# Patient Record
Sex: MALE | Race: BLACK OR AFRICAN AMERICAN | URBAN - METROPOLITAN AREA
[De-identification: names, ages, dates, MRNs, and addresses within clinical notes are randomized per-mention and may not be internally consistent; named-entity substitution may affect disease eponyms.]

---

## 2018-11-16 ENCOUNTER — HISTORICAL (OUTPATIENT)
Dept: WOUND CARE | Facility: HOSPITAL | Age: 53
End: 2018-11-16

## 2018-11-23 ENCOUNTER — HISTORICAL (OUTPATIENT)
Dept: WOUND CARE | Facility: HOSPITAL | Age: 53
End: 2018-11-23

## 2018-11-30 ENCOUNTER — HISTORICAL (OUTPATIENT)
Dept: WOUND CARE | Facility: HOSPITAL | Age: 53
End: 2018-11-30

## 2018-12-07 ENCOUNTER — HISTORICAL (OUTPATIENT)
Dept: WOUND CARE | Facility: HOSPITAL | Age: 53
End: 2018-12-07

## 2018-12-10 LAB — FINAL CULTURE: NORMAL

## 2018-12-14 ENCOUNTER — HISTORICAL (OUTPATIENT)
Dept: WOUND CARE | Facility: HOSPITAL | Age: 53
End: 2018-12-14

## 2018-12-21 ENCOUNTER — HISTORICAL (OUTPATIENT)
Dept: WOUND CARE | Facility: HOSPITAL | Age: 53
End: 2018-12-21

## 2018-12-28 ENCOUNTER — HISTORICAL (OUTPATIENT)
Dept: WOUND CARE | Facility: HOSPITAL | Age: 53
End: 2018-12-28

## 2019-01-04 ENCOUNTER — HISTORICAL (OUTPATIENT)
Dept: WOUND CARE | Facility: HOSPITAL | Age: 54
End: 2019-01-04

## 2019-01-11 ENCOUNTER — HISTORICAL (OUTPATIENT)
Dept: WOUND CARE | Facility: HOSPITAL | Age: 54
End: 2019-01-11

## 2019-01-25 ENCOUNTER — HISTORICAL (OUTPATIENT)
Dept: WOUND CARE | Facility: HOSPITAL | Age: 54
End: 2019-01-25

## 2019-11-22 ENCOUNTER — HISTORICAL (OUTPATIENT)
Dept: WOUND CARE | Facility: HOSPITAL | Age: 54
End: 2019-11-22

## 2019-12-04 ENCOUNTER — HISTORICAL (OUTPATIENT)
Dept: ADMINISTRATIVE | Facility: HOSPITAL | Age: 54
End: 2019-12-04

## 2019-12-04 LAB
ABS NEUT (OLG): 3.9 X10(3)/MCL (ref 1.5–6.9)
ALBUMIN SERPL-MCNC: 2.7 GM/DL (ref 3.4–5)
ALBUMIN/GLOB SERPL: 0.6 RATIO
ALP SERPL-CCNC: 52 UNIT/L (ref 30–113)
ALT SERPL-CCNC: 51 UNIT/L (ref 10–45)
AST SERPL-CCNC: 26 UNIT/L (ref 15–37)
BASOPHILS # BLD AUTO: 0 X10(3)/MCL (ref 0–0.1)
BASOPHILS NFR BLD AUTO: 1 % (ref 0–1)
BILIRUB SERPL-MCNC: 0.4 MG/DL (ref 0.1–0.9)
BILIRUBIN DIRECT+TOT PNL SERPL-MCNC: 0.1 MG/DL (ref 0–0.3)
BILIRUBIN DIRECT+TOT PNL SERPL-MCNC: 0.3 MG/DL
BUN SERPL-MCNC: 10 MG/DL (ref 10–20)
CALCIUM SERPL-MCNC: 8.9 MG/DL (ref 8–10.5)
CHLORIDE SERPL-SCNC: 106 MMOL/L (ref 100–108)
CO2 SERPL-SCNC: 30 MMOL/L (ref 21–35)
CREAT SERPL-MCNC: 0.83 MG/DL (ref 0.7–1.3)
EOSINOPHIL # BLD AUTO: 0.2 X10(3)/MCL (ref 0–0.6)
EOSINOPHIL NFR BLD AUTO: 2 % (ref 0–5)
ERYTHROCYTE [DISTWIDTH] IN BLOOD BY AUTOMATED COUNT: 12.3 % (ref 11.5–17)
GLOBULIN SER-MCNC: 4.5 GM/DL
GLUCOSE SERPL-MCNC: 137 MG/DL (ref 75–116)
HCT VFR BLD AUTO: 33.7 % (ref 42–52)
HGB BLD-MCNC: 11.1 GM/DL (ref 14–18)
IMM GRANULOCYTES # BLD AUTO: 0.02 10*3/UL (ref 0–0.02)
IMM GRANULOCYTES NFR BLD AUTO: 0.3 % (ref 0–0.43)
LYMPHOCYTES # BLD AUTO: 1.9 X10(3)/MCL (ref 0.5–4.1)
LYMPHOCYTES NFR BLD AUTO: 28 % (ref 15–40)
MAGNESIUM SERPL-MCNC: 1.9 MG/DL (ref 1.8–2.4)
MCH RBC QN AUTO: 29 PG (ref 27–34)
MCHC RBC AUTO-ENTMCNC: 33 GM/DL (ref 31–36)
MCV RBC AUTO: 89 FL (ref 80–99)
MONOCYTES # BLD AUTO: 0.7 X10(3)/MCL (ref 0–1.1)
MONOCYTES NFR BLD AUTO: 10 % (ref 4–12)
NEUTROPHILS # BLD AUTO: 3.9 X10(3)/MCL (ref 1.5–6.9)
NEUTROPHILS NFR BLD AUTO: 58 % (ref 43–75)
PHOSPHATE SERPL-MCNC: 3.8 MG/DL (ref 2.6–4.7)
PLATELET # BLD AUTO: 203 X10(3)/MCL (ref 140–400)
PMV BLD AUTO: 11.5 FL (ref 6.8–10)
POTASSIUM SERPL-SCNC: 3.7 MMOL/L (ref 3.6–5.2)
PROT SERPL-MCNC: 7.2 GM/DL (ref 6.4–8.2)
RBC # BLD AUTO: 3.8 X10(6)/MCL (ref 4.7–6.1)
SODIUM SERPL-SCNC: 142 MMOL/L (ref 135–145)
VANCOMYCIN TROUGH SERPL-MCNC: 10.2 MCG/ML (ref 10–20)
VANCOMYCIN TROUGH SERPL-MCNC: 11.2 MCG/ML (ref 10–20)
WBC # SPEC AUTO: 6.7 X10(3)/MCL (ref 4.5–11.5)

## 2019-12-05 LAB
ABS NEUT (OLG): 4 X10(3)/MCL (ref 1.5–6.9)
ALBUMIN SERPL-MCNC: 2.9 GM/DL (ref 3.4–5)
ALBUMIN/GLOB SERPL: 0.6 RATIO
ALP SERPL-CCNC: 58 UNIT/L (ref 30–113)
ALT SERPL-CCNC: 55 UNIT/L (ref 10–45)
AST SERPL-CCNC: 27 UNIT/L (ref 15–37)
BASOPHILS # BLD AUTO: 0 X10(3)/MCL (ref 0–0.1)
BASOPHILS NFR BLD AUTO: 1 % (ref 0–1)
BILIRUB SERPL-MCNC: 0.4 MG/DL (ref 0.1–0.9)
BILIRUBIN DIRECT+TOT PNL SERPL-MCNC: 0.1 MG/DL (ref 0–0.3)
BILIRUBIN DIRECT+TOT PNL SERPL-MCNC: 0.3 MG/DL
BUN SERPL-MCNC: 10 MG/DL (ref 10–20)
CALCIUM SERPL-MCNC: 9.1 MG/DL (ref 8–10.5)
CHLORIDE SERPL-SCNC: 106 MMOL/L (ref 100–108)
CO2 SERPL-SCNC: 31 MMOL/L (ref 21–35)
CREAT SERPL-MCNC: 0.71 MG/DL (ref 0.7–1.3)
EOSINOPHIL # BLD AUTO: 0.2 X10(3)/MCL (ref 0–0.6)
EOSINOPHIL NFR BLD AUTO: 3 % (ref 0–5)
ERYTHROCYTE [DISTWIDTH] IN BLOOD BY AUTOMATED COUNT: 12.5 % (ref 11.5–17)
GLOBULIN SER-MCNC: 4.7 GM/DL
GLUCOSE SERPL-MCNC: 128 MG/DL (ref 75–116)
HCT VFR BLD AUTO: 36.1 % (ref 42–52)
HGB BLD-MCNC: 11.8 GM/DL (ref 14–18)
IMM GRANULOCYTES # BLD AUTO: 0.03 10*3/UL (ref 0–0.02)
IMM GRANULOCYTES NFR BLD AUTO: 0.4 % (ref 0–0.43)
LYMPHOCYTES # BLD AUTO: 2.1 X10(3)/MCL (ref 0.5–4.1)
LYMPHOCYTES NFR BLD AUTO: 30 % (ref 15–40)
MAGNESIUM SERPL-MCNC: 1.9 MG/DL (ref 1.8–2.4)
MCH RBC QN AUTO: 29 PG (ref 27–34)
MCHC RBC AUTO-ENTMCNC: 33 GM/DL (ref 31–36)
MCV RBC AUTO: 89 FL (ref 80–99)
MONOCYTES # BLD AUTO: 0.6 X10(3)/MCL (ref 0–1.1)
MONOCYTES NFR BLD AUTO: 8 % (ref 4–12)
NEUTROPHILS # BLD AUTO: 4 X10(3)/MCL (ref 1.5–6.9)
NEUTROPHILS NFR BLD AUTO: 58 % (ref 43–75)
PHOSPHATE SERPL-MCNC: 4.1 MG/DL (ref 2.6–4.7)
PLATELET # BLD AUTO: 224 X10(3)/MCL (ref 140–400)
PMV BLD AUTO: 11.5 FL (ref 6.8–10)
POTASSIUM SERPL-SCNC: 3.8 MMOL/L (ref 3.6–5.2)
PROT SERPL-MCNC: 7.6 GM/DL (ref 6.4–8.2)
RBC # BLD AUTO: 4.04 X10(6)/MCL (ref 4.7–6.1)
SODIUM SERPL-SCNC: 141 MMOL/L (ref 135–145)
WBC # SPEC AUTO: 7 X10(3)/MCL (ref 4.5–11.5)

## 2019-12-06 LAB — VANCOMYCIN TROUGH SERPL-MCNC: 13.7 MCG/ML (ref 10–20)

## 2019-12-08 LAB — VANCOMYCIN TROUGH SERPL-MCNC: 13.7 MCG/ML (ref 10–20)

## 2019-12-10 LAB — VANCOMYCIN TROUGH SERPL-MCNC: 15.3 MCG/ML (ref 10–20)

## 2019-12-11 LAB
ABS NEUT (OLG): 3.7 X10(3)/MCL (ref 1.5–6.9)
ALBUMIN SERPL-MCNC: 3.1 GM/DL (ref 3.4–5)
ALBUMIN/GLOB SERPL: 0.7 RATIO
ALP SERPL-CCNC: 52 UNIT/L (ref 30–113)
ALT SERPL-CCNC: 72 UNIT/L (ref 10–45)
AST SERPL-CCNC: 22 UNIT/L (ref 15–37)
BASOPHILS # BLD AUTO: 0 X10(3)/MCL (ref 0–0.1)
BASOPHILS NFR BLD AUTO: 0 % (ref 0–1)
BILIRUB SERPL-MCNC: 0.3 MG/DL (ref 0.1–0.9)
BILIRUBIN DIRECT+TOT PNL SERPL-MCNC: 0.1 MG/DL (ref 0–0.3)
BILIRUBIN DIRECT+TOT PNL SERPL-MCNC: 0.2 MG/DL
BUN SERPL-MCNC: 12 MG/DL (ref 10–20)
CALCIUM SERPL-MCNC: 9.5 MG/DL (ref 8–10.5)
CHLORIDE SERPL-SCNC: 104 MMOL/L (ref 100–108)
CO2 SERPL-SCNC: 33 MMOL/L (ref 21–35)
CREAT SERPL-MCNC: 0.75 MG/DL (ref 0.7–1.3)
EOSINOPHIL # BLD AUTO: 0.2 X10(3)/MCL (ref 0–0.6)
EOSINOPHIL NFR BLD AUTO: 3 % (ref 0–5)
ERYTHROCYTE [DISTWIDTH] IN BLOOD BY AUTOMATED COUNT: 12.6 % (ref 11.5–17)
GLOBULIN SER-MCNC: 4.4 GM/DL
GLUCOSE SERPL-MCNC: 139 MG/DL (ref 75–116)
HCT VFR BLD AUTO: 36.8 % (ref 42–52)
HGB BLD-MCNC: 11.7 GM/DL (ref 14–18)
IMM GRANULOCYTES # BLD AUTO: 0.03 10*3/UL (ref 0–0.02)
IMM GRANULOCYTES NFR BLD AUTO: 0.5 % (ref 0–0.43)
LYMPHOCYTES # BLD AUTO: 2 X10(3)/MCL (ref 0.5–4.1)
LYMPHOCYTES NFR BLD AUTO: 30 % (ref 15–40)
MCH RBC QN AUTO: 29 PG (ref 27–34)
MCHC RBC AUTO-ENTMCNC: 32 GM/DL (ref 31–36)
MCV RBC AUTO: 90 FL (ref 80–99)
MONOCYTES # BLD AUTO: 0.5 X10(3)/MCL (ref 0–1.1)
MONOCYTES NFR BLD AUTO: 8 % (ref 4–12)
NEUTROPHILS # BLD AUTO: 3.7 X10(3)/MCL (ref 1.5–6.9)
NEUTROPHILS NFR BLD AUTO: 58 % (ref 43–75)
PLATELET # BLD AUTO: 202 X10(3)/MCL (ref 140–400)
PMV BLD AUTO: 11.2 FL (ref 6.8–10)
POTASSIUM SERPL-SCNC: 4 MMOL/L (ref 3.6–5.2)
PROT SERPL-MCNC: 7.5 GM/DL (ref 6.4–8.2)
RBC # BLD AUTO: 4.07 X10(6)/MCL (ref 4.7–6.1)
SODIUM SERPL-SCNC: 144 MMOL/L (ref 135–145)
WBC # SPEC AUTO: 6.5 X10(3)/MCL (ref 4.5–11.5)

## 2019-12-12 LAB — VANCOMYCIN TROUGH SERPL-MCNC: 13 MCG/ML (ref 10–20)

## 2019-12-13 LAB — VANCOMYCIN TROUGH SERPL-MCNC: 14 MCG/ML (ref 10–20)

## 2019-12-14 LAB — VANCOMYCIN TROUGH SERPL-MCNC: 21.5 MCG/ML (ref 10–20)

## 2019-12-15 LAB — VANCOMYCIN SERPL-MCNC: 5.7 MCG/ML

## 2019-12-17 LAB
BUN SERPL-MCNC: 13 MG/DL (ref 10–20)
CALCIUM SERPL-MCNC: 9.2 MG/DL (ref 8–10.5)
CHLORIDE SERPL-SCNC: 106 MMOL/L (ref 100–108)
CO2 SERPL-SCNC: 32 MMOL/L (ref 21–35)
CREAT SERPL-MCNC: 0.87 MG/DL (ref 0.7–1.3)
CREAT/UREA NIT SERPL: 15
GLUCOSE SERPL-MCNC: 122 MG/DL (ref 75–116)
POTASSIUM SERPL-SCNC: 3.9 MMOL/L (ref 3.6–5.2)
SODIUM SERPL-SCNC: 145 MMOL/L (ref 135–145)
VANCOMYCIN TROUGH SERPL-MCNC: 13 MCG/ML (ref 10–20)

## 2019-12-18 LAB — VANCOMYCIN TROUGH SERPL-MCNC: 12.4 MCG/ML (ref 10–20)

## 2019-12-20 LAB — VANCOMYCIN TROUGH SERPL-MCNC: 19 MCG/ML (ref 10–20)

## 2019-12-22 LAB
VANCOMYCIN TROUGH SERPL-MCNC: 18.8 MCG/ML (ref 10–20)
VANCOMYCIN TROUGH SERPL-MCNC: 19 MCG/ML (ref 10–20)

## 2019-12-23 LAB — VANCOMYCIN TROUGH SERPL-MCNC: 16.9 MCG/ML (ref 10–20)

## 2020-02-23 LAB — GRAM STN SPEC: NORMAL

## 2020-02-25 LAB — FINAL CULTURE: NORMAL

## 2021-12-10 LAB — VANCOMYCIN SERPL-MCNC: 9 UG/ML (ref 15–20)

## 2021-12-11 ENCOUNTER — HISTORICAL (OUTPATIENT)
Dept: ADMINISTRATIVE | Facility: HOSPITAL | Age: 56
End: 2021-12-11

## 2021-12-11 LAB
ABS NEUT (OLG): 5.1 X10(3)/MCL (ref 1.5–6.9)
ALBUMIN SERPL-MCNC: 2.6 GM/DL (ref 3.5–5)
ALBUMIN/GLOB SERPL: 0.7 RATIO (ref 1.1–2)
ALP SERPL-CCNC: 54 UNIT/L (ref 40–150)
ALT SERPL-CCNC: 26 UNIT/L (ref 0–55)
AST SERPL-CCNC: 18 UNIT/L (ref 5–34)
BASOPHILS # BLD AUTO: 0 X10(3)/MCL (ref 0–0.1)
BASOPHILS NFR BLD AUTO: 0 % (ref 0–1)
BILIRUB SERPL-MCNC: 0.4 MG/DL
BILIRUBIN DIRECT+TOT PNL SERPL-MCNC: 0.2 MG/DL (ref 0–0.5)
BILIRUBIN DIRECT+TOT PNL SERPL-MCNC: 0.2 MG/DL (ref 0–0.8)
BUN SERPL-MCNC: 9 MG/DL (ref 8.4–25.7)
CALCIUM SERPL-MCNC: 9.1 MG/DL (ref 8.7–10.5)
CHLORIDE SERPL-SCNC: 105 MMOL/L (ref 98–107)
CO2 SERPL-SCNC: 27 MMOL/L (ref 22–29)
CREAT SERPL-MCNC: 0.78 MG/DL (ref 0.73–1.18)
EOSINOPHIL # BLD AUTO: 0.3 X10(3)/MCL (ref 0–0.6)
EOSINOPHIL NFR BLD AUTO: 4 % (ref 0–5)
ERYTHROCYTE [DISTWIDTH] IN BLOOD BY AUTOMATED COUNT: 11.8 % (ref 11.5–17)
EST. AVERAGE GLUCOSE BLD GHB EST-MCNC: 208.7 MG/DL
GLOBULIN SER-MCNC: 3.9 GM/DL (ref 2.4–3.5)
GLUCOSE SERPL-MCNC: 114 MG/DL (ref 74–100)
HBA1C MFR BLD: 8.9 %
HCT VFR BLD AUTO: 37 % (ref 42–52)
HGB BLD-MCNC: 12.4 GM/DL (ref 14–18)
IMM GRANULOCYTES # BLD AUTO: 0.03 10*3/UL (ref 0–0.02)
IMM GRANULOCYTES NFR BLD AUTO: 0.4 % (ref 0–0.43)
LYMPHOCYTES # BLD AUTO: 1.5 X10(3)/MCL (ref 0.5–4.1)
LYMPHOCYTES NFR BLD AUTO: 19 % (ref 15–40)
MAGNESIUM SERPL-MCNC: 1.9 MG/DL (ref 1.6–2.6)
MCH RBC QN AUTO: 29 PG (ref 27–34)
MCHC RBC AUTO-ENTMCNC: 34 GM/DL (ref 31–36)
MCV RBC AUTO: 86 FL (ref 80–99)
MONOCYTES # BLD AUTO: 0.9 X10(3)/MCL (ref 0–1.1)
MONOCYTES NFR BLD AUTO: 11 % (ref 4–12)
NEUTROPHILS # BLD AUTO: 5.1 X10(3)/MCL (ref 1.5–6.9)
NEUTROPHILS NFR BLD AUTO: 66 % (ref 43–75)
PHOSPHATE SERPL-MCNC: 3.3 MG/DL (ref 2.3–4.7)
PLATELET # BLD AUTO: 225 X10(3)/MCL (ref 140–400)
PMV BLD AUTO: 11.9 FL (ref 6.8–10)
POTASSIUM SERPL-SCNC: 4.1 MMOL/L (ref 3.5–5.1)
PREALB SERPL-MCNC: 8.6 MG/DL (ref 18–45)
PROT SERPL-MCNC: 6.5 GM/DL (ref 6.4–8.3)
RBC # BLD AUTO: 4.29 X10(6)/MCL (ref 4.7–6.1)
SODIUM SERPL-SCNC: 139 MMOL/L (ref 136–145)
WBC # SPEC AUTO: 7.8 X10(3)/MCL (ref 4.5–11.5)

## 2021-12-12 LAB — VANCOMYCIN TROUGH SERPL-MCNC: 23.5 UG/ML (ref 15–20)

## 2021-12-13 LAB
ABS NEUT (OLG): 5.8 X10(3)/MCL (ref 1.5–6.9)
ALBUMIN SERPL-MCNC: 2.6 GM/DL (ref 3.5–5)
ALBUMIN/GLOB SERPL: 0.7 RATIO (ref 1.1–2)
ALP SERPL-CCNC: 51 UNIT/L (ref 40–150)
ALT SERPL-CCNC: 33 UNIT/L (ref 0–55)
AST SERPL-CCNC: 17 UNIT/L (ref 5–34)
BASOPHILS # BLD AUTO: 0 X10(3)/MCL (ref 0–0.1)
BASOPHILS NFR BLD AUTO: 0 % (ref 0–1)
BILIRUB SERPL-MCNC: 0.3 MG/DL
BILIRUBIN DIRECT+TOT PNL SERPL-MCNC: 0.1 MG/DL (ref 0–0.8)
BILIRUBIN DIRECT+TOT PNL SERPL-MCNC: 0.2 MG/DL (ref 0–0.5)
BUN SERPL-MCNC: 12 MG/DL (ref 8.4–25.7)
CALCIUM SERPL-MCNC: 8.7 MG/DL (ref 8.7–10.5)
CHLORIDE SERPL-SCNC: 108 MMOL/L (ref 98–107)
CO2 SERPL-SCNC: 27 MMOL/L (ref 22–29)
CREAT SERPL-MCNC: 1.32 MG/DL (ref 0.73–1.18)
EOSINOPHIL # BLD AUTO: 0.2 X10(3)/MCL (ref 0–0.6)
EOSINOPHIL NFR BLD AUTO: 2 % (ref 0–5)
ERYTHROCYTE [DISTWIDTH] IN BLOOD BY AUTOMATED COUNT: 11.9 % (ref 11.5–17)
GLOBULIN SER-MCNC: 3.8 GM/DL (ref 2.4–3.5)
GLUCOSE SERPL-MCNC: 134 MG/DL (ref 74–100)
HCT VFR BLD AUTO: 37.1 % (ref 42–52)
HGB BLD-MCNC: 12.2 GM/DL (ref 14–18)
IMM GRANULOCYTES # BLD AUTO: 0.04 10*3/UL (ref 0–0.02)
IMM GRANULOCYTES NFR BLD AUTO: 0.5 % (ref 0–0.43)
LYMPHOCYTES # BLD AUTO: 1.5 X10(3)/MCL (ref 0.5–4.1)
LYMPHOCYTES NFR BLD AUTO: 18 % (ref 15–40)
MAGNESIUM SERPL-MCNC: 2 MG/DL (ref 1.6–2.6)
MCH RBC QN AUTO: 29 PG (ref 27–34)
MCHC RBC AUTO-ENTMCNC: 33 GM/DL (ref 31–36)
MCV RBC AUTO: 87 FL (ref 80–99)
MONOCYTES # BLD AUTO: 0.8 X10(3)/MCL (ref 0–1.1)
MONOCYTES NFR BLD AUTO: 9 % (ref 4–12)
NEUTROPHILS # BLD AUTO: 5.8 X10(3)/MCL (ref 1.5–6.9)
NEUTROPHILS NFR BLD AUTO: 69 % (ref 43–75)
PHOSPHATE SERPL-MCNC: 3.8 MG/DL (ref 2.3–4.7)
PLATELET # BLD AUTO: 243 X10(3)/MCL (ref 140–400)
PMV BLD AUTO: 11.6 FL (ref 6.8–10)
POTASSIUM SERPL-SCNC: 3.7 MMOL/L (ref 3.5–5.1)
PROT SERPL-MCNC: 6.4 GM/DL (ref 6.4–8.3)
RBC # BLD AUTO: 4.25 X10(6)/MCL (ref 4.7–6.1)
SODIUM SERPL-SCNC: 143 MMOL/L (ref 136–145)
VANCOMYCIN TROUGH SERPL-MCNC: 30.1 UG/ML (ref 15–20)
WBC # SPEC AUTO: 8.4 X10(3)/MCL (ref 4.5–11.5)

## 2021-12-14 LAB
ABS NEUT (OLG): 6.6 X10(3)/MCL (ref 1.5–6.9)
BASOPHILS # BLD AUTO: 0 X10(3)/MCL (ref 0–0.1)
BASOPHILS NFR BLD AUTO: 0 % (ref 0–1)
BUN SERPL-MCNC: 11 MG/DL (ref 8.4–25.7)
CALCIUM SERPL-MCNC: 9.3 MG/DL (ref 8.7–10.5)
CHLORIDE SERPL-SCNC: 108 MMOL/L (ref 98–107)
CO2 SERPL-SCNC: 28 MMOL/L (ref 22–29)
CREAT SERPL-MCNC: 1.24 MG/DL (ref 0.73–1.18)
CREAT/UREA NIT SERPL: 9
EOSINOPHIL # BLD AUTO: 0.2 X10(3)/MCL (ref 0–0.6)
EOSINOPHIL NFR BLD AUTO: 2 % (ref 0–5)
ERYTHROCYTE [DISTWIDTH] IN BLOOD BY AUTOMATED COUNT: 11.6 % (ref 11.5–17)
GLUCOSE SERPL-MCNC: 115 MG/DL (ref 74–100)
HCT VFR BLD AUTO: 38.4 % (ref 42–52)
HGB BLD-MCNC: 12.9 GM/DL (ref 14–18)
IMM GRANULOCYTES # BLD AUTO: 0.04 10*3/UL (ref 0–0.02)
IMM GRANULOCYTES NFR BLD AUTO: 0.4 % (ref 0–0.43)
LYMPHOCYTES # BLD AUTO: 1.5 X10(3)/MCL (ref 0.5–4.1)
LYMPHOCYTES NFR BLD AUTO: 16 % (ref 15–40)
MCH RBC QN AUTO: 29 PG (ref 27–34)
MCHC RBC AUTO-ENTMCNC: 34 GM/DL (ref 31–36)
MCV RBC AUTO: 87 FL (ref 80–99)
MONOCYTES # BLD AUTO: 0.9 X10(3)/MCL (ref 0–1.1)
MONOCYTES NFR BLD AUTO: 10 % (ref 4–12)
NEUTROPHILS # BLD AUTO: 6.6 X10(3)/MCL (ref 1.5–6.9)
NEUTROPHILS NFR BLD AUTO: 71 % (ref 43–75)
PLATELET # BLD AUTO: 248 X10(3)/MCL (ref 140–400)
PMV BLD AUTO: 11.7 FL (ref 6.8–10)
POTASSIUM SERPL-SCNC: 4.3 MMOL/L (ref 3.5–5.1)
RBC # BLD AUTO: 4.42 X10(6)/MCL (ref 4.7–6.1)
SODIUM SERPL-SCNC: 145 MMOL/L (ref 136–145)
WBC # SPEC AUTO: 9.3 X10(3)/MCL (ref 4.5–11.5)

## 2021-12-20 LAB
ABS NEUT (OLG): 4.8 X10(3)/MCL (ref 1.5–6.9)
ALBUMIN SERPL-MCNC: 3.1 GM/DL (ref 3.5–5)
ALBUMIN/GLOB SERPL: 0.8 RATIO (ref 1.1–2)
ALP SERPL-CCNC: 48 UNIT/L (ref 40–150)
ALT SERPL-CCNC: 34 UNIT/L (ref 0–55)
AST SERPL-CCNC: 20 UNIT/L (ref 5–34)
BASOPHILS # BLD AUTO: 0 X10(3)/MCL (ref 0–0.1)
BASOPHILS NFR BLD AUTO: 0 % (ref 0–1)
BILIRUB SERPL-MCNC: 0.4 MG/DL
BILIRUBIN DIRECT+TOT PNL SERPL-MCNC: 0.2 MG/DL (ref 0–0.5)
BILIRUBIN DIRECT+TOT PNL SERPL-MCNC: 0.2 MG/DL (ref 0–0.8)
BUN SERPL-MCNC: 20 MG/DL (ref 8.4–25.7)
CALCIUM SERPL-MCNC: 9.7 MG/DL (ref 8.7–10.5)
CHLORIDE SERPL-SCNC: 104 MMOL/L (ref 98–107)
CO2 SERPL-SCNC: 29 MMOL/L (ref 22–29)
CREAT SERPL-MCNC: 1.19 MG/DL (ref 0.73–1.18)
EOSINOPHIL # BLD AUTO: 0.2 X10(3)/MCL (ref 0–0.6)
EOSINOPHIL NFR BLD AUTO: 2 % (ref 0–5)
ERYTHROCYTE [DISTWIDTH] IN BLOOD BY AUTOMATED COUNT: 11.6 % (ref 11.5–17)
GLOBULIN SER-MCNC: 3.9 GM/DL (ref 2.4–3.5)
GLUCOSE SERPL-MCNC: 101 MG/DL (ref 74–100)
HCT VFR BLD AUTO: 36.9 % (ref 42–52)
HGB BLD-MCNC: 12.4 GM/DL (ref 14–18)
IMM GRANULOCYTES # BLD AUTO: 0.03 10*3/UL (ref 0–0.02)
IMM GRANULOCYTES NFR BLD AUTO: 0.4 % (ref 0–0.43)
LYMPHOCYTES # BLD AUTO: 2 X10(3)/MCL (ref 0.5–4.1)
LYMPHOCYTES NFR BLD AUTO: 25 % (ref 15–40)
MCH RBC QN AUTO: 29 PG (ref 27–34)
MCHC RBC AUTO-ENTMCNC: 34 GM/DL (ref 31–36)
MCV RBC AUTO: 87 FL (ref 80–99)
MONOCYTES # BLD AUTO: 0.9 X10(3)/MCL (ref 0–1.1)
MONOCYTES NFR BLD AUTO: 11 % (ref 4–12)
NEUTROPHILS # BLD AUTO: 4.8 X10(3)/MCL (ref 1.5–6.9)
NEUTROPHILS NFR BLD AUTO: 61 % (ref 43–75)
PLATELET # BLD AUTO: 247 X10(3)/MCL (ref 140–400)
PMV BLD AUTO: 11.5 FL (ref 6.8–10)
POTASSIUM SERPL-SCNC: 3.8 MMOL/L (ref 3.5–5.1)
PROT SERPL-MCNC: 7 GM/DL (ref 6.4–8.3)
RBC # BLD AUTO: 4.26 X10(6)/MCL (ref 4.7–6.1)
SODIUM SERPL-SCNC: 141 MMOL/L (ref 136–145)
WBC # SPEC AUTO: 7.9 X10(3)/MCL (ref 4.5–11.5)

## 2021-12-25 LAB
ABS NEUT (OLG): 4.4 X10(3)/MCL (ref 1.5–6.9)
ALBUMIN SERPL-MCNC: 3.4 GM/DL (ref 3.5–5)
ALBUMIN/GLOB SERPL: 0.8 RATIO (ref 1.1–2)
ALP SERPL-CCNC: 52 UNIT/L (ref 40–150)
ALT SERPL-CCNC: 38 UNIT/L (ref 0–55)
AST SERPL-CCNC: 19 UNIT/L (ref 5–34)
BASOPHILS # BLD AUTO: 0 X10(3)/MCL (ref 0–0.1)
BASOPHILS NFR BLD AUTO: 1 % (ref 0–1)
BILIRUB SERPL-MCNC: 0.3 MG/DL
BILIRUBIN DIRECT+TOT PNL SERPL-MCNC: 0.1 MG/DL (ref 0–0.5)
BILIRUBIN DIRECT+TOT PNL SERPL-MCNC: 0.2 MG/DL (ref 0–0.8)
BUN SERPL-MCNC: 16 MG/DL (ref 8.4–25.7)
CALCIUM SERPL-MCNC: 9.4 MG/DL (ref 8.7–10.5)
CHLORIDE SERPL-SCNC: 101 MMOL/L (ref 98–107)
CO2 SERPL-SCNC: 29 MMOL/L (ref 22–29)
CREAT SERPL-MCNC: 1.32 MG/DL (ref 0.73–1.18)
EOSINOPHIL # BLD AUTO: 0.2 X10(3)/MCL (ref 0–0.6)
EOSINOPHIL NFR BLD AUTO: 3 % (ref 0–5)
ERYTHROCYTE [DISTWIDTH] IN BLOOD BY AUTOMATED COUNT: 11.9 % (ref 11.5–17)
GLOBULIN SER-MCNC: 4.1 GM/DL (ref 2.4–3.5)
GLUCOSE SERPL-MCNC: 234 MG/DL (ref 74–100)
HCT VFR BLD AUTO: 39.6 % (ref 42–52)
HGB BLD-MCNC: 13 GM/DL (ref 14–18)
IMM GRANULOCYTES # BLD AUTO: 0.01 10*3/UL (ref 0–0.02)
IMM GRANULOCYTES NFR BLD AUTO: 0.1 % (ref 0–0.43)
LYMPHOCYTES # BLD AUTO: 1.9 X10(3)/MCL (ref 0.5–4.1)
LYMPHOCYTES NFR BLD AUTO: 27 % (ref 15–40)
MAGNESIUM SERPL-MCNC: 1.9 MG/DL (ref 1.6–2.6)
MCH RBC QN AUTO: 29 PG (ref 27–34)
MCHC RBC AUTO-ENTMCNC: 33 GM/DL (ref 31–36)
MCV RBC AUTO: 88 FL (ref 80–99)
MONOCYTES # BLD AUTO: 0.6 X10(3)/MCL (ref 0–1.1)
MONOCYTES NFR BLD AUTO: 8 % (ref 4–12)
NEUTROPHILS # BLD AUTO: 4.4 X10(3)/MCL (ref 1.5–6.9)
NEUTROPHILS NFR BLD AUTO: 62 % (ref 43–75)
PHOSPHATE SERPL-MCNC: 2.9 MG/DL (ref 2.3–4.7)
PLATELET # BLD AUTO: 194 X10(3)/MCL (ref 140–400)
PMV BLD AUTO: 11.4 FL (ref 6.8–10)
POTASSIUM SERPL-SCNC: 4.2 MMOL/L (ref 3.5–5.1)
PROT SERPL-MCNC: 7.5 GM/DL (ref 6.4–8.3)
RBC # BLD AUTO: 4.52 X10(6)/MCL (ref 4.7–6.1)
SODIUM SERPL-SCNC: 142 MMOL/L (ref 136–145)
WBC # SPEC AUTO: 7.2 X10(3)/MCL (ref 4.5–11.5)

## 2021-12-28 LAB
ABS NEUT (OLG): 5 X10(3)/MCL (ref 1.5–6.9)
ALBUMIN SERPL-MCNC: 3.1 GM/DL (ref 3.5–5)
ALBUMIN/GLOB SERPL: 0.9 RATIO (ref 1.1–2)
ALP SERPL-CCNC: 44 UNIT/L (ref 40–150)
ALT SERPL-CCNC: 24 UNIT/L (ref 0–55)
AST SERPL-CCNC: 12 UNIT/L (ref 5–34)
BASOPHILS # BLD AUTO: 0 X10(3)/MCL (ref 0–0.1)
BASOPHILS NFR BLD AUTO: 0 % (ref 0–1)
BILIRUB SERPL-MCNC: 0.4 MG/DL
BILIRUBIN DIRECT+TOT PNL SERPL-MCNC: 0.2 MG/DL (ref 0–0.5)
BILIRUBIN DIRECT+TOT PNL SERPL-MCNC: 0.2 MG/DL (ref 0–0.8)
BUN SERPL-MCNC: 18 MG/DL (ref 8.4–25.7)
CALCIUM SERPL-MCNC: 9.7 MG/DL (ref 8.7–10.5)
CHLORIDE SERPL-SCNC: 105 MMOL/L (ref 98–107)
CO2 SERPL-SCNC: 29 MMOL/L (ref 22–29)
CREAT SERPL-MCNC: 1.03 MG/DL (ref 0.73–1.18)
EOSINOPHIL # BLD AUTO: 0.2 X10(3)/MCL (ref 0–0.6)
EOSINOPHIL NFR BLD AUTO: 2 % (ref 0–5)
ERYTHROCYTE [DISTWIDTH] IN BLOOD BY AUTOMATED COUNT: 12 % (ref 11.5–17)
EST CREAT CLEARANCE SER (OHS): 137.06 ML/MIN
GLOBULIN SER-MCNC: 3.5 GM/DL (ref 2.4–3.5)
GLUCOSE SERPL-MCNC: 108 MG/DL (ref 74–100)
HCT VFR BLD AUTO: 35.5 % (ref 42–52)
HGB BLD-MCNC: 12 GM/DL (ref 14–18)
IMM GRANULOCYTES # BLD AUTO: 0.02 10*3/UL (ref 0–0.02)
IMM GRANULOCYTES NFR BLD AUTO: 0.3 % (ref 0–0.43)
LYMPHOCYTES # BLD AUTO: 1.8 X10(3)/MCL (ref 0.5–4.1)
LYMPHOCYTES NFR BLD AUTO: 24 % (ref 15–40)
MCH RBC QN AUTO: 29 PG (ref 27–34)
MCHC RBC AUTO-ENTMCNC: 34 GM/DL (ref 31–36)
MCV RBC AUTO: 86 FL (ref 80–99)
MONOCYTES # BLD AUTO: 0.7 X10(3)/MCL (ref 0–1.1)
MONOCYTES NFR BLD AUTO: 9 % (ref 4–12)
NEUTROPHILS # BLD AUTO: 5 X10(3)/MCL (ref 1.5–6.9)
NEUTROPHILS NFR BLD AUTO: 64 % (ref 43–75)
PLATELET # BLD AUTO: 158 X10(3)/MCL (ref 140–400)
PMV BLD AUTO: 11.5 FL (ref 6.8–10)
POTASSIUM SERPL-SCNC: 3.9 MMOL/L (ref 3.5–5.1)
PROT SERPL-MCNC: 6.6 GM/DL (ref 6.4–8.3)
RBC # BLD AUTO: 4.14 X10(6)/MCL (ref 4.7–6.1)
SODIUM SERPL-SCNC: 142 MMOL/L (ref 136–145)
WBC # SPEC AUTO: 7.7 X10(3)/MCL (ref 4.5–11.5)

## 2022-04-30 NOTE — DISCHARGE SUMMARY
Patient:   Ziggy Quiles            MRN: [id number]            FIN: [id number]               Age:   54 years     Sex:  Male     :  1965   Associated Diagnoses:   Diabetes mellitus; Gangrene of toe; Hypertension; Osteomyelitis   Author:   Ajay PRICE, Joseph RUFF      Results Review   General results   Today's results   2019 17:12 CST     Progress Note-Physician   discharge    2019 17:10 CST     Discharge Summary         Discharge Summary  (Anticipated)   2019 16:40 CST     Breakfast Percent         100 %                             Lunch Percent             100 %                             Dinner Percent            100 %                             Afternoon Snack Percent   100 %    2019 16:39 CST     Urinary Continence        Always continent    2019 16:00 CST     Environmental Safety Implemented          Adequate room lighting, Bed in low position, Call device within reach                             Continuous Visual Observation             N/A                             Patient Location          Patient's room                             Patient Visitors          None                             Patient Safety Measures in Use            All items within reach                             Rights Restriction Reason Fall                             Peripherally inserted central catheter (PICC) Double Right                                   Central IV Indication:                Long term infusions (consider PICC)                                 Central IV Activity:  Assess                                 Central IV Site Condition:            No complications                                 Central IV Care:      Flushed                                 Central IV Dressing:  Dry, Intact                                 Central IV Drainage Description:      None                                 Central IV Patency Proximal Port:     No complications                                  Central IV Patency Distal Port:       No complications                             Patient Position          Sitting in chair                             Elimination Assistance Offered Q2H        Independent                             Blood Glucose Meter Serial Number         [id number]                             Blood Glucose Testing Reason              Routine                             Blood Glucose Stick Site  Finger, Left                             Blood Glucose Interventions               Administered agent to decrease blood sugar    12/23/2019 15:47 CST     [id number] Comatose           No - 0                             HE3271 Expression of Ideas and Wants      Expresses Complex Messages - 4                             [id number] Understanding Verbal Content       Understands - 4                              Acute Change in Mental Status Base  0. No                              Abno Behavior Fluctuate During Day  0. No                              Difficulty Focusing Attention       0. No                               Disorganized Thinking              0. No                              Level Of Consciousness              Alert (Normal)                             PW5561 Eating             Indep                             BS3908 Oral Hygiene       Indep                             SW1519 Toileting Hygiene  Setup/Clean-up                             IY4893 Wash Upper Body    Indep                             YA7636 Roll Left and Right                Indep                             MC4574 Sit to Lying       Indep                             GI2328 Lying to Sitting on Side of Bed    Indep                             PG8546 Sit to Stand       Indep                             DH0328 Chair,Bed to Chair Transfer        Indep                             NY7609 Toilet Transfer    Indep                             RD2967 Walk 10 Feet       Indep                             GH9143 Walk 50 Feet with  Two Turns        Indep                             QL3820 Walk 150 Feet      Indep                             CQ1349 Patient Use Wheelchair,Scooter     No                              Any Falls Since Admission           No                              Unhealed Pressure Ulcers/Injuries   0. No                             [id number] Flu Vac Recieved This Facility     0. No                             [id number] Reason Flu Vaccine Not Received    Received outside of this facility                             [facility] C[facility] Data Set Planned DC - Text      LT[facility] CARE Data Set Planned DC    12/23/2019 15:46 CST     Blood Glucose, POC        290 mg/dL  HI    12/23/2019 15:15 CST     Education Note            Education Note    12/23/2019 15:15 CST     Inpatient Patient Summary Inpatient Patient Summary  (Modified)   12/23/2019 15:15 CST     Inpatient Clinical Summary                Inpatient Clinical Summary  (Modified)   12/23/2019 15:15 CST     Temperature Oral          36.8 DegC                             Temperature Oral (calculated)             98.24 DegF                             Peripheral Pulse Rate     72 bpm                             Heart Rate Monitored      85 bpm                             Respiratory Rate          20 br/min                             SpO2                      99 %                             Oxygen Therapy            Room air                             Systolic Blood Pressure   141 mmHg  HI                             Diastolic Blood Pressure  81 mmHg                             Mean Arterial Pressure, Cuff              101 mmHg                             Blood Pressure Location   Left arm                             Vital Signs - Depart Process              Vital Signs - Depart Process    12/23/2019 15:00 CST     Temperature Oral          36.8 DegC                             Temperature Oral (calculated)             98.24 DegF                             Heart Rate Monitored      85 bpm                              Respiratory Rate          20 br/min                             SpO2                      99 %                             Systolic Blood Pressure   141 mmHg  HI                             Diastolic Blood Pressure  81 mmHg                             Mean Arterial Pressure, Cuff              101 mmHg                             Pain Present              No actual or suspected pain    12/23/2019 14:00 CST     Environmental Safety Implemented          Adequate room lighting, Bed in low position, Call device within reach                             Continuous Visual Observation             N/A                             Patient Location          Patient's room                             Patient Visitors          None                             Patient Safety Measures in Use            All items within reach                             Rights Restriction Reason Fall                             Patient Position          Sitting in chair                             Elimination Assistance Offered Q2H        Independent    12/23/2019 13:32 CST     Affect/Behavior           Appropriate, Cooperative                             Orientation Assessment    Oriented x 4                             Living Environment        Living Environment  (Modified)                            Lives In                  Single level home                             Lives With                Family                             Living Situation          Home with family care                             Home Barriers             None                             Patient's Responsibilities                Bathing, Cooking, Dressing, Housework                             Ability to Understand Others              Understands                             CaseManagement D/C Sum LTAC/SNF - Text    CaseManagement D/C Summary LTAC/SNF  (Modified)   12/23/2019 13:11 CST     Environmental Safety Management           Done     12/23/2019 13:00 CST     Pain Present              No actual or suspected pain                             Environmental Safety Implemented          Adequate room lighting, Bed in low position, Call device within reach                             Patient Location          Patient's room                             Patient Visitors          None                             Patient Safety Measures in Use            All items within reach                             Patient Position          Sitting in chair                             Elimination Assistance Offered [facility]        Independent    12/23/2019 12:00 CST     Environmental Safety Implemented          Adequate room lighting, Bed in low position, Call device within reach, Non-Slip footwear, Personal items within reach                             Continuous Visual Observation             N/A                             Patient Location          Patient's room                             Patient Visitors          None                             Patient Safety Measures in Use            All items within reach                             Rights Restriction Reason Fall                             Patient Position          Sitting in chair                             Elimination Assistance Offered [facility]        Independent    12/23/2019 11:00 CST     Pain Present              No actual or suspected pain                             Environmental Safety Implemented          Adequate room lighting, Bed in low position, Call device within reach                             Patient Location          Patient's room                             Patient Visitors          None                             Patient Safety Measures in Use            All items within reach                             Peripherally inserted central catheter (PICC) Double Right                                   Central IV Indication:                Long term infusions (consider PICC)                                  Central IV Activity:  Assess                                 Central IV Site Condition:            No complications                                 Central IV Care:      Flushed                                 Central IV Dressing:  Dry, Intact                                 Central IV Drainage Description:      None                                 Central IV Patency Proximal Port:     No complications                                 Central IV Patency Distal Port:       No complications                             Patient Position          Sitting in chair                             Elimination Assistance Offered Q2H        Independent                             Blood Glucose Meter Serial Number         209                             Blood Glucose Testing Reason              Routine                             Blood Glucose Stick Site  Finger, Left                             Blood Glucose Interventions               Administered agent to decrease blood sugar    12/23/2019 10:00 CST     Continuous Visual Observation             N/A                             Patient Location          Patient's room                             Patient Visitors          None                             Patient Safety Measures in Use            All items within reach                             Rights Restriction Reason Fall                             Patient Position          Semi-Mora's                             Elimination Assistance Offered Q2H        Independent    12/23/2019 9:02 CST      [id number] Eating             Indep                             VS1895 Oral Hygiene       Indep                             ZB8011 Toileting Hygiene  Setup/Clean-up                             LW6939 Wash Upper Body    Indep                             TW9422 Roll Left and Right                Indep                             BU0940 Sit to Lying       Indep                             HA2204 Lying to Sitting on Side of Bed     Indep                             XN1852 Sit to Stand       Indep                             SM2664 Chair,Bed to Chair Transfer        Indep                             FQ0814 Toilet Transfer    Indep                             HB6086 Walk 10 Feet       Indep                             UO2106 Walk 50 Feet with Two Turns        Indep                             GR4768 Walk 150 Feet      Indep                             HC1751 Patient Use Wheelchair,Scooter     No                             CDS Funct Abilities & Goals [place] DC -Text  CDS Functional Abilities and Goals Ongoing Discharge    12/23/2019 9:01 CST      ADLs                      Independent                             Nursing Discharge Summary - Text          Nursing Discharge Summary    12/23/2019 9:00 CST      Vanco Tr                  16.9 mcg/mL                             Pain Present              No actual or suspected pain                             Environmental Safety Implemented          Adequate room lighting, Bed in low position, Call device within reach                             Patient Location          Patient's room                             Patient Visitors          None                             Patient Safety Measures in Use            All items within reach                             Patient Position          Sitting in chair                             Elimination Assistance Offered Q2H        Independent                             History of Fall in Last 3 Months Polanco    No                             Presence of Secondary Diagnosis Polanco     Yes                             Use of Ambulatory Aid Polanco               Crutches, cane, walker                             IV/Heparin Lock Fall Risk Polanco           Yes                             Gait Weak or Impaired Fall Risk Polanco     Normal, bedrest, immobile                             Mental Status Fall Risk Polanco             Oriented to own ability                              Polanco Fall Risk Score     50                              Acute Change in Mental Status Base  0. No                              Abno Behavior Fluctuate During Day  0. No                              Difficulty Focusing Attention       0. No                               Disorganized Thinking              0. No                              Level Of Consciousness              Alert (Normal)                             Polanco Fall Risk - Text    Polanco Fall Risk Scale    12/23/2019 8:59 CST      24 HR Intake Totals       0 mL                             24 HR Output Totals       0 mL                             24 HR I&O Balance         0 mL    12/23/2019 8:59 CST      Education Note            Education Note    12/23/2019 8:45 CST      Heart Rate Monitored      72 bpm                             Heart Rate Monitored      72 bpm                             Systolic Blood Pressure   137 mmHg                             Systolic Blood Pressure   137 mmHg                             Diastolic Blood Pressure  82 mmHg                             Diastolic Blood Pressure  82 mmHg    12/23/2019 8:24 CST      Education Note            Education Note    12/23/2019 8:00 CST      Temperature Oral          36.6 DegC                             Temperature Oral (calculated)             97.88 DegF                             Peripheral Pulse Rate     72 bpm                             Respiratory Rate          20 br/min                             SpO2                      98 %                             Oxygen Therapy            Room air                             Systolic Blood Pressure   137 mmHg                             Diastolic Blood Pressure  82 mmHg                             Mean Arterial Pressure, Cuff              100 mmHg                             Pain Present              No actual or suspected pain                             Continuous Visual Observation             N/A                              Patient Location          Patient's room                             Patient Visitors          None                             Patient Safety Measures in Use            All items within reach                             Rights Restriction Reason Fall                             Respiratory Symptoms      None                             Respirations              Unlabored, Quiet                             Respiratory Pattern       Regular                             Breath Sounds Auscultated Anterior only                             All Lobes Breath Sounds   Clear                             Nail Bed Color            Pink                             Clubbing Present          No                             Capillary Refill          Less than 2 seconds                             Jugular Venous Distention Unable to visualize                             Heart Rhythm              Regular                             Radial Pulses Bilateral   2+ Normal                             Dorsalis Pedis Bilateral  2+ Normal                             Level of Consciousness    Alert                             Loss of Consciousness     No                             Affect/Behavior           Appropriate, Calm, Cooperative                             Characteristics of Communication          Appropriate                             Characteristics of Speech Clear                             Hallucinations Present    None                             Gait                      Steady                             Movement of Extremities   Lower extremity equal, Upper extremity equal                             Aspiration Risk           None                             Facial Symmetry           Symmetric                             Orientation Assessment    Oriented x 4                             Abdomen Description       Symmetric                             Abdomen Palpation         Soft, Non-Tender                              Passing Flatus            Yes                             Bowel Sounds All Quadrants                Present                             Urine Color               Yellow                             Skin Color General        Usual for ethnicity                             Skin Temperature          Warm                             Skin Moisture General     Dry                             Skin Integrity General    Localized abnormality                             Skin Turgor General       Elastic                             Mucous Membrane Color     Pink                             Mucous Membrane Description               Moist                             Skin Temperature, Upper Extremities       Warm                             Skin Temperature, Lower Extremities       Warm                             Patient Position          Semi-Mora's                             Elimination Assistance Offered Q2H        Independent                             Appetite                  Good                             RN Clinical Review Done   Yes    12/23/2019 7:52 CST      Education Note            Education Note    12/23/2019 7:52 CST      Education Note            Education Note    12/23/2019 7:51 CST      ADLs                      Independent                             Nursing Discharge Summary - Text          Nursing Discharge Summary    12/23/2019 7:00 CST      Oxygen Therapy            Room air                             Pain Present              No actual or suspected pain                             Environmental Safety Implemented          Adequate room lighting, Bed in low position, Call device within reach                             Patient Location          Patient's room                             Patient Visitors          None                             Patient Safety Measures in Use            All items within reach                             DVT Prophylaxis           Enoxaparin                              DVT SCD Excluding Factors None                             DVT Age                   40-60                             DVT Procedures            Major laproscopic surgery in past 30 days                             DVT Disease Processess    Not applicable                             DVT Lifestyle             Obesity > 20% IDBW                             DVT Risk Score            3                             Respiratory Symptoms      None                             Respirations              Unlabored, Quiet                             Respiratory Pattern       Regular                             Breath Sounds Auscultated Anterior and posterior                             All Lobes Breath Sounds   Clear                             Nail Bed Color            Pink                             Nail Bed Description Left Hand            Pink                             Nail Bed Description Right Hand           Pink                             Nail Bed Description Left Foot            Pink                             Nail Bed Description Right Foot           Pink                             Clubbing Present          No                             Capillary Refill          Less than 2 seconds                             Capillary Refill Left Hand                Less than 2 seconds                             Capillary Refill Right Hand               Less than 2 seconds                             Capillary Refill Left Foot                Less than 2 seconds                             Capillary Refill Right Foot               Less than 2 seconds                             Jugular Venous Distention Unable to visualize                             Heart Rhythm              Regular                             Radial Pulses Bilateral   2+ Normal                             Edema                     None                             Level of Consciousness    Alert                             Loss of Consciousness     No                              Affect/Behavior           Appropriate, Calm, Cooperative                             Characteristics of Communication          Appropriate                             Characteristics of Speech Clear                             Hallucinations Present    None                             Gait                      Steady                             Movement of Extremities   Lower extremity equal, Upper extremity equal                             Aspiration Risk           None                             Facial Symmetry           Symmetric                             PERRLA                    Yes                             Reaction-Pupil, Right     Brisk                             Left Upper Extremity Strength - ICU       5 = Normal strength                             Right Upper Extremity Strength - ICU      5 = Normal strength                             Left Lower Extremity Strength - ICU       5 = Normal strength                             Right Lower Extremity Strength - ICU      5 = Normal strength                             Tone, Left Upper Extremity                Normal                             Tone, Right Upper Extremity               Normal                             Tone, Left Lower Extremity                Normal                             Tone, Right Lower Extremity               Normal                             Left Upper Extremity Sensation            Intact                             Right Upper Extremity Sensation           Intact                             Left Lower Extremity Sensation            Intact                             Right Lower Extremity Sensation           Intact                             LUE, Follows Commands     Yes                             RUE, Follows Commands     Yes                             LLE, Follows Commands     Yes                             RLE, Follows Commands     Yes                             Orientation Assessment    Oriented  x 4                             Support Person            Not present                             Support Person Involvement                Limited support/involvement                             Support Person Interaction w/ Care Team   Appropriately interacts with health care team                             Patient Coping            Malaika with stressors                             Patient Interaction w/Healthcare Team     Appropriately interacts with health care team                             Patient feelings/concerns Discusses care, feelings, concerns                             Abdomen Description       Symmetric                             Abdomen Palpation         Soft, Non-Tender                             Passing Flatus            Yes                             Bowel Sounds All Quadrants                Present                             Urinary Elimination       Voiding, no difficulties                             Urine Color               Yellow                             Urine Description         Clear                             Skin Color General        Usual for ethnicity                             Skin Temperature          Warm                             Skin Moisture General     Dry                             Skin Integrity General    Localized abnormality                             Skin Turgor General       Elastic                             Mucous Membrane Color     Pink                             Mucous Membrane Description               Moist                             Left Upper Extremity Description          Pink                             Right Upper Extremity Description         Pink                             Left Lower Extremity Description          Pink                             Right Lower Extremity Description         Pink                             Temperature Left Upper Extremity          Warm                             Temperature Right Upper Extremity         Warm                              Skin Temperature, Upper Extremities       Warm                             Temperature Left Lower Extremity          Warm                             Temperature Right Lower Extremity         Warm                             Skin Temperature, Lower Extremities       Warm                             Sensory Perception [place] No impairment                             Moisture [place]           Rarely moist                             Activity Milton           Walks occasionally                             Mobility Milton           No limitations                             Nutrition Milton          Adequate                             Friction and Shear [place] No apparent problem                             Milton Score              21                             Toe Right Surgical incision                                              Incision, Wound Assessment By         Nurse                                 Pressure Point:       None                                 Incision, Wound Dressing:             Petroleum gauze                                 Incision, Wound Dressing Assessment:  Clean                                 Incision, Wound Dressing Activity:    Changed                                 Incision, Wound Cleansing:            Cleaned with soap and water                                 Wound Bed Tissue Type:                Adipose                                 Wound Percent Granulated:             100 %                                 Wound Exudate Amount: None                                 Wound Edge:           Approximated                                 Incision,Wound Surrounding Tissue Color:    Normal                             Positioning Details       Self positioning                             Peripherally inserted central catheter (PICC) Double Right                                   Central IV Indication:                Long term infusions (consider PICC)                                  Central IV Activity:  Assess                                 Central IV Site Condition:            No complications                                 Central IV Care:      Flushed                                 Central IV Dressing:  Dry, Intact                                 Central IV Drainage Description:      None                                 Central IV Patency Proximal Port:     No complications                                 Central IV Patency Distal Port:       No complications                             Patient Position          Sitting in bed                             Elimination Assistance Offered Q2H        Independent                             Appetite                  Good    12/23/2019 5:00 CST      Oxygen Therapy            Room air                             Continuous Visual Observation             N/A                             Patient Location          Patient's room                             Patient Visitors          None                             Patient Safety Measures in Use            All items within reach                             Rights Restriction Reason Fall                             Patient Position          Semi-Mora's, Supine                             Elimination Assistance Offered Q2H        Independent                             Blood Glucose Testing Reason              Routine                             Blood Glucose Stick Site  Finger                             Blood Glucose Interventions               Administered agent to decrease blood sugar                             Blood Glucose, POC        160 mg/dL  HI    12/23/2019 4:00 CST      Oxygen Therapy            Room air                             Continuous Visual Observation             N/A                             Patient Location          Patient's room                             Patient Visitors          None                             Patient Safety Measures in Use             All items within reach                             Rights Restriction Reason Fall                             Patient Position          Semi-Mora's, Supine                             Elimination Assistance Offered Q2H        Independent    12/23/2019 3:00 CST      Oxygen Therapy            Room air                             Continuous Visual Observation             N/A                             Patient Location          Patient's room                             Patient Visitors          None                             Patient Safety Measures in Use            All items within reach                             Rights Restriction Reason Fall                             Peripherally inserted central catheter (PICC) Double Right                                   Central IV Indication:                Long term infusions (consider PICC)                                 Central IV Activity:  Assess                                 Central IV Site Condition:            No complications                                 Central IV Dressing:  Dry, Intact                                 Central IV Drainage Description:      None                                 Central IV Patency Proximal Port:     No complications                             Patient Position          Semi-Mora's, Supine                             Elimination Assistance Offered Q2H        Independent    12/23/2019 2:00 CST      Oxygen Therapy            Room air                             Continuous Visual Observation             N/A                             Patient Location          Patient's room                             Patient Visitors          None                             Patient Safety Measures in Use            All items within reach                             Rights Restriction Reason Fall                             Patient Position          Semi-Mora's, Supine                             Elimination Assistance Offered Q2H         Independent    12/23/2019 1:56 CST      Preferred Pain Tool       Numeric rating scale                             Numeric Rating at Rest    2    12/23/2019 1:00 CST      Oxygen Therapy            Room air                             Continuous Visual Observation             N/A                             Patient Location          Patient's room                             Patient Visitors          None                             Patient Safety Measures in Use            All items within reach                             Rights Restriction Reason Fall                             Patient Position          Semi-Mora's, Supine                             Elimination Assistance Offered Q2H        Independent    12/23/2019 0:59 CST      24 HR Intake Totals       1,688.33 mL                             24 HR Output Totals       0 mL                             24 HR I&O Balance         1,688.33 mL    12/23/2019 0:56 CST      Numeric Rating at Rest    8                             Primary Pain Location     Foot                             Primary Pain Laterality   Right                             Pasero Opioid Induced Sedation Scale      1=Awake and alert    12/23/2019 0:00 CST      Oxygen Therapy            Room air                             Continuous Visual Observation             N/A                             Patient Location          Patient's room                             Patient Visitors          None                             Patient Safety Measures in Use            All items within reach                             Rights Restriction Reason Fall                             Patient Position          Semi-Mora's, Supine                             Elimination Assistance Offered Q2H        Independent        Discharge Information      Discharge Summary Information   Admitted  11/22/2019   Discharged  12/23/2019   Discharge diagnosis     Diabetes mellitus (EGR33-LF E11.9).     Gangrene of toe  ([id number] I96).     Hypertension (DTN60-QG I10).     Osteomyelitis (URW01-WY M86.9).        Physical Examination   General:  Alert and oriented, No acute distress.    Eye:  Pupils are equal, round and reactive to light, Extraocular movements are intact, Normal conjunctiva, Vision unchanged.    HENT:  Normocephalic, Tympanic membranes are clear, Normal hearing, Oral mucosa is moist, No pharyngeal erythema.    Neck:  Supple, Non-tender, No carotid bruit, No jugular venous distention, No lymphadenopathy, No thyromegaly.    Respiratory:  Lungs are clear to auscultation, Respirations are non-labored, Breath sounds are equal, Symmetrical chest wall expansion, No chest wall tenderness.    Cardiovascular:  Normal rate, Regular rhythm, No murmur, No gallop, Good pulses equal in all extremities, Normal peripheral perfusion, No edema.    Gastrointestinal:  Soft, Non-tender, Non-distended, Normal bowel sounds, No organomegaly.    Genitourinary:  No costovertebral angle tenderness, No scrotal tenderness, No inguinal tenderness, No urethral discharge.    Vital Signs   12/23/2019 15:15 CST     Temperature Oral          36.8 DegC                             Temperature Oral (calculated)             98.24 DegF                             Peripheral Pulse Rate     72 bpm                             Heart Rate Monitored      85 bpm                             Respiratory Rate          20 br/min                             SpO2                      99 %                             Oxygen Therapy            Room air                             Systolic Blood Pressure   141 mmHg  HI                             Diastolic Blood Pressure  81 mmHg                             Mean Arterial Pressure, Cuff              101 mmHg                             Blood Pressure Location   Left arm    12/23/2019 15:00 CST     Temperature Oral          36.8 DegC                             Temperature Oral (calculated)             98.24 DegF                              Heart Rate Monitored      85 bpm                             Respiratory Rate          20 br/min                             SpO2                      99 %                             Systolic Blood Pressure   141 mmHg  HI                             Diastolic Blood Pressure  81 mmHg                             Mean Arterial Pressure, Cuff              101 mmHg    12/23/2019 8:59 CST      24 HR Intake Totals       0 mL                             24 HR Output Totals       0 mL                             24 HR I&O Balance         0 mL    12/23/2019 8:45 CST      Heart Rate Monitored      72 bpm                             Heart Rate Monitored      72 bpm                             Systolic Blood Pressure   137 mmHg                             Systolic Blood Pressure   137 mmHg                             Diastolic Blood Pressure  82 mmHg                             Diastolic Blood Pressure  82 mmHg    12/23/2019 8:00 CST      Temperature Oral          36.6 DegC                             Temperature Oral (calculated)             97.88 DegF                             Peripheral Pulse Rate     72 bpm                             Respiratory Rate          20 br/min                             SpO2                      98 %                             Oxygen Therapy            Room air                             Systolic Blood Pressure   137 mmHg                             Diastolic Blood Pressure  82 mmHg                             Mean Arterial Pressure, Cuff              100 mmHg    12/23/2019 7:00 CST      Oxygen Therapy            Room air    12/23/2019 5:00 CST      Oxygen Therapy            Room air    12/23/2019 4:00 CST      Oxygen Therapy            Room air    12/23/2019 3:00 CST      Oxygen Therapy            Room air    12/23/2019 2:00 CST      Oxygen Therapy            Room air    12/23/2019 1:00 CST      Oxygen Therapy            Room air    12/23/2019 0:59 CST      24 HR Intake Totals        1,688.33 mL                             24 HR Output Totals       0 mL                             24 HR I&O Balance         1,688.33 mL    12/23/2019 0:00 CST      Oxygen Therapy            Room air        No qualifying data available   Lymphatics:  No lymphadenopathy neck, axilla, groin.    Musculoskeletal:  Normal range of motion, Normal strength, No tenderness, No swelling, No deformity, Normal gait.    Integumentary:  Warm, Dry, Pink, Intact, No pallor, No rash.    Neurologic:  Alert, Oriented, Normal sensory, Normal motor function, No focal deficits, Cranial Nerves II-XII are grossly intact, Gag reflex normal, Normal deep tendon reflexes.    Psychiatric:  Cooperative, Appropriate mood & affect, Normal judgment, Non-suicidal.       Hospital Course   4-year-old -American male admitted to [facility] from long-term antibiotic therapy for osteomyelitis and gangrene of his great toe.  Was status post debridement by Dr. Wilfrido Wells then removed mid amputation of second toe.  Was noted to have osteomyelitis per scan.  Continued on vancomycin throughout his stay and showed rapid improvement.  His wound healed very well with no drainage time of discharge there was no swelling no tenderness to the.    Patient stay was complicated by some hypertension I added Norvasc 10 mg at night and this corrected the problem.  On discharge patient's blood pressure was stable.    Diabetes was well controlled to stay with his regular p.o. medications as well as coverage with a sliding scale.    At this time patient is pain-free and has been cleared by Dr. Wells for outpatient wound care.      Discharge Plan   Discharged patient to home.  He is to follow-up with Dr. Wells within a week.    He is to follow-up with his primary care physician within a week.    We will continue him on p.o. doxycycline 100 mg twice a day as well as Cipro 500 mg twice a day for an additional 7 to 10 days.    We will defer any further  treatment to Dr. Wells at this time.    We will continue outpatient wound care.

## 2022-05-03 NOTE — HISTORICAL [facility]ERNER
This is a historical note converted from Dorian. Formatting and pictures may have been removed.  Please reference Dorian for original formatting and attached multimedia. Chief Complaint  ??osteomyelitis with amputation of the right second?toe?  Reason for Consultation  Wound care consultation  56-year-old -American male seen previously at the [facility]. ?Presents on referral from [facility] s/p amputation of his right second toe. 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 ?He is admitted for long-term IV antibiotic therapy as well as wound care physical therapy and management of his complex medical conditions including diabetes hypertension peripheral vascular disease hyperlipidemia.? Hx per Dr. Kaur H & P.  ?  Patient has previously amputated right great toe which is fully healed.  ?  Wound care consulted for evaluation and dressing recommendations.  ?  The amputation site does display a moderate amount of slough, a small amount of necrotic tissue, and granular tissue in the wound bed.? Currently using wet to dry Dakins solution.? We will continue to monitor and add Mesalt and/or Santyl if appropriate in upcoming days.  ?  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  Hypertension?I10  Osteomyelitis?M86.9   Problem List/Past Medical History  Ongoing  Diabetes mellitus  Gangrene of toe  Hypertension  Osteomyelitis  Historical  No qualifying data  Procedure/Surgical History  Amputation of toe (12/07/2021)  Chemical cauterization of granulation tissue (ie, proud flesh) (05/15/2020)  Destruction of Right Foot Skin, External Approach (05/15/2020)  Debridement (eg, high pressure waterjet with/without suction, sharp selective debridement with scissors, scalpel and forceps), open wound, (eg, fibrin, devitalized epidermis and/or dermis, exudate, debris, biofilm), including topical application(s), wound (04/17/2020)  Extraction of Right Foot  Skin, External Approach (04/17/2020)  Debridement (eg, high pressure waterjet with/without suction, sharp selective debridement with scissors, scalpel and forceps), open wound, (eg, fibrin, devitalized epidermis and/or dermis, exudate, debris, biofilm), including topical application(s), wound (03/27/2020)  Extraction of Right Foot Skin, External Approach (03/27/2020)  Debridement (eg, high pressure waterjet with/without suction, sharp selective debridement with scissors, scalpel and forceps), open wound, (eg, fibrin, devitalized epidermis and/or dermis, exudate, debris, biofilm), including topical application(s), wound (03/13/2020)  Extraction of Right Foot Skin, External Approach (03/13/2020)  Debridement, subcutaneous tissue (includes epidermis and dermis, if performed); first 20 sq cm or less (03/06/2020)  Excision of Right Foot Subcutaneous Tissue and Fascia, Open Approach (03/06/2020)  Debridement (eg, high pressure waterjet with/without suction, sharp selective debridement with scissors, scalpel and forceps), open wound, (eg, fibrin, devitalized epidermis and/or dermis, exudate, debris, biofilm), including topical application(s), wound (02/28/2020)  Extraction of Right Foot Skin, External Approach (02/28/2020)  Debridement (eg, high pressure waterjet with/without suction, sharp selective debridement with scissors, scalpel and forceps), open wound, (eg, fibrin, devitalized epidermis and/or dermis, exudate, debris, biofilm), including topical application(s), wound (02/21/2020)  Extraction of Right Foot Skin, External Approach (02/21/2020)  Debridement (eg, high pressure waterjet with/without suction, sharp selective debridement with scissors, scalpel and forceps), open wound, (eg, fibrin, devitalized epidermis and/or dermis, exudate, debris, biofilm), including topical application(s), wound (02/07/2020)  Extraction of Right Foot Skin, External Approach (02/07/2020)  Debridement (eg, high pressure waterjet  with/without suction, sharp selective debridement with scissors, scalpel and forceps), open wound, (eg, fibrin, devitalized epidermis and/or dermis, exudate, debris, biofilm), including topical application(s), wound (01/24/2020)  Extraction of Right Foot Skin, External Approach (01/24/2020)  Debridement (eg, high pressure waterjet with/without suction, sharp selective debridement with scissors, scalpel and forceps), open wound, (eg, fibrin, devitalized epidermis and/or dermis, exudate, debris, biofilm), including topical application(s), wound (01/17/2020)  Extraction of Right Foot Skin, External Approach (01/17/2020)  Debridement (eg, high pressure waterjet with/without suction, sharp selective debridement with scissors, scalpel and forceps), open wound, (eg, fibrin, devitalized epidermis and/or dermis, exudate, debris, biofilm), including topical application(s), wound (12/30/2019)  Extraction of Right Foot Skin, External Approach (12/30/2019)  Debridement, subcutaneous tissue (includes epidermis and dermis, if performed); first 20 sq cm or less (11/22/2019)  Excision of Right Foot Subcutaneous Tissue and Fascia, Open Approach (11/22/2019)  Debridement (eg, high pressure waterjet with/without suction, sharp selective debridement with scissors, scalpel and forceps), open wound, (eg, fibrin, devitalized epidermis and/or dermis, exudate, debris, biofilm), including topical application(s), wound (01/11/2019)  Extraction of Right Foot Skin, External Approach (01/11/2019)  Compression of Right Lower Leg using Pressure Dressing (01/04/2019)  Debridement (eg, high pressure waterjet with/without suction, sharp selective debridement with scissors, scalpel and forceps), open wound, (eg, fibrin, devitalized epidermis and/or dermis, exudate, debris, biofilm), including topical application(s), wound (01/04/2019)  Extraction of Right Lower Leg Skin, External Approach (01/04/2019)  Strapping; Unna boot (01/04/2019)  Compression of  Right Lower Leg using Pressure Dressing (12/28/2018)  Debridement (eg, high pressure waterjet with/without suction, sharp selective debridement with scissors, scalpel and forceps), open wound, (eg, fibrin, devitalized epidermis and/or dermis, exudate, debris, biofilm), including topical application(s), wound (12/28/2018)  Extraction of Right Lower Leg Skin, External Approach (12/28/2018)  Strapping; Unna boot (12/28/2018)  Compression of Right Lower Leg using Pressure Dressing (12/21/2018)  Debridement (eg, high pressure waterjet with/without suction, sharp selective debridement with scissors, scalpel and forceps), open wound, (eg, fibrin, devitalized epidermis and/or dermis, exudate, debris, biofilm), including topical application(s), wound (12/21/2018)  Extraction of Right Lower Leg Skin, External Approach (12/21/2018)  Strapping; Unna boot (12/21/2018)  Compression of Right Lower Leg using Pressure Dressing (12/14/2018)  Debridement (eg, high pressure waterjet with/without suction, sharp selective debridement with scissors, scalpel and forceps), open wound, (eg, fibrin, devitalized epidermis and/or dermis, exudate, debris, biofilm), including topical application(s), wound (12/14/2018)  Extraction of Right Lower Leg Skin, External Approach (12/14/2018)  Strapping; Unna boot (12/14/2018)  Compression of Right Lower Leg using Pressure Dressing (12/07/2018)  Debridement (eg, high pressure waterjet with/without suction, sharp selective debridement with scissors, scalpel and forceps), open wound, (eg, fibrin, devitalized epidermis and/or dermis, exudate, debris, biofilm), including topical application(s), wound (12/07/2018)  Extraction of Right Lower Leg Skin, External Approach (12/07/2018)  Strapping; Unna boot (12/07/2018)  Compression of Right Lower Leg using Pressure Dressing (11/30/2018)  Debridement (eg, high pressure waterjet with/without suction, sharp selective debridement with scissors, scalpel and forceps),  open wound, (eg, fibrin, devitalized epidermis and/or dermis, exudate, debris, biofilm), including topical application(s), wound (11/30/2018)  Extraction of Right Lower Leg Skin, External Approach (11/30/2018)  Strapping; Unna boot (11/30/2018)  Compression of Right Lower Leg using Pressure Dressing (11/23/2018)  Debridement (eg, high pressure waterjet with/without suction, sharp selective debridement with scissors, scalpel and forceps), open wound, (eg, fibrin, devitalized epidermis and/or dermis, exudate, debris, biofilm), including topical application(s), wound (11/23/2018)  Extraction of Right Lower Leg Skin, External Approach (11/23/2018)  Strapping; Unna boot (11/23/2018)  Compression of Right Lower Leg using Pressure Dressing (11/16/2018)  Debridement (eg, high pressure waterjet with/without suction, sharp selective debridement with scissors, scalpel and forceps), open wound, (eg, fibrin, devitalized epidermis and/or dermis, exudate, debris, biofilm), including topical application(s), wound (11/16/2018)  Extraction of Right Lower Leg Skin, External Approach (11/16/2018)  Strapping; Unna boot (11/16/2018)   Medications  Inpatient  No active inpatient medications  Home  Cipro 500 mg oral tablet, 500 mg= 1 tab(s), Oral, q12hr  Dulcolax Laxative 10 mg RECTAL suppository, 10 mg= 1 supp, VT (rectal), q24hr, PRN  gabapentin 300 mg oral capsule, 300 mg= 1 cap(s), Oral, TID  hydroCHLOROthiazide 12.5 mg oral capsule, 12.5 mg= 1 cap(s), Oral, Daily  Lipitor 10 mg oral tablet, 10 mg= 1 tab(s), Oral, Daily  Lotensin 40 mg oral tablet, 40 mg= 1 tab(s), Oral, Daily  Lubriderm topical lotion, small amount, TOP, Every other day, PRN  magnesium oxide 400 mg oral tablet, 400 mg= 1 tab(s), Oral, BID  metoprolol succinate 50 mg oral capsule, extended release, 50 mg= 1 cap(s), Oral, Daily  minoxidil 2.5 mg oral tablet, 5 mg= 2 tab(s), Oral, BID  Neosporin Antibiotic  Norvasc 10 mg oral tablet, 10 mg= 1 tab(s), Oral, Daily  Pepcid  20 mg oral tablet, 20 mg= 1 tab(s), Oral, BID  Percocet 2.5 mg-325 mg oral tablet, 1 tab(s), Oral, q6hr, PRN  Phenergan, 6.25 mg, IM, q4hr, PRN  Plavix 75 mg oral tablet, 75 mg= 1 tab(s), Oral, Daily  Trulicity Pen 0.75 mg/0.5 mL subcutaneous solution, 0.75 mg= 0.5 mL, Subcutaneous, qWeek  Zofran 4 mg/5 mL oral solution, 4 mg= 5 mL, Oral, q12hr, PRN  Zyvox 600 mg oral tablet, 600 mg= 1 tab(s), Oral, q12hr  Allergies  No Known Allergies  Social History  Abuse/Neglect  No, No, No, 12/10/2021  Tobacco  Never (less than 100 in lifetime), No, 12/10/2021